# Patient Record
Sex: MALE | Race: WHITE
[De-identification: names, ages, dates, MRNs, and addresses within clinical notes are randomized per-mention and may not be internally consistent; named-entity substitution may affect disease eponyms.]

---

## 2019-12-05 ENCOUNTER — HOSPITAL ENCOUNTER (OUTPATIENT)
Dept: HOSPITAL 47 - SLEEP | Age: 44
End: 2019-12-05
Attending: INTERNAL MEDICINE
Payer: COMMERCIAL

## 2019-12-05 DIAGNOSIS — J98.8: ICD-10-CM

## 2019-12-05 DIAGNOSIS — F10.20: ICD-10-CM

## 2019-12-05 DIAGNOSIS — G47.33: Primary | ICD-10-CM

## 2019-12-05 DIAGNOSIS — Z98.890: ICD-10-CM

## 2019-12-05 DIAGNOSIS — F17.210: ICD-10-CM

## 2019-12-05 PROCEDURE — 99211 OFF/OP EST MAY X REQ PHY/QHP: CPT

## 2019-12-05 NOTE — CONS
CONSULTATION



DATE OF SERVICE:

12/05/2019



This patient is a 44-year-old gentleman who has been evaluated in the sleep center for

difficulties initiating sleep and multiple awakenings from sleep with snoring and

gasping for air.



HISTORY OF PRESENT ILLNESS/SLEEP-WAKE EVALUATION:

Patient's sleep schedule on weekdays is from 11 p.m. until 5 a.m. and he gets out of

bed around 8 a.m. On weekends he sleeps from 1 a.m. until 6 a.m. and he gets out of bed

around 11 a.m. He does have a problem with falling asleep.  He has a TV set in the

bedroom.  He wakes up from sleep multiple times; he has counted more than 30, with up

to 2 episodes of nocturia at night.  The patient starts to see dreams as he is falling

asleep, possibly hypnagogical hallucinations.  No history of sleep paralysis or

cataplexy.  The patient feels significantly sleepy and tired during the day.  If he is

falling asleep during the day, he may see vivid dreams. [place] Sleepiness Scale is in

a very high range at 15.



PAST MEDICAL HISTORY:

Practically negative.



PAST SURGICAL HISTORY:

Right hand surgery.



MEDICATIONS:

None.



SOCIAL HISTORY:

Positive for smoking for 20 pack/years.  Patient continues to smoke.  Alcohol

consumption up to 2 whiskeys every night because the patient believes that it may help

him to sleep better.



REVIEW OF SYSTEMS:

Difficulties initiating sleep. Multiple awakenings from sleep. Sleepiness during the

day.



PHYSICAL EXAMINATION:

GENERAL: A pleasant  gentleman without distress.

VITAL SIGNS: /78, , RR 16. Height _____ inches, weight 183.8 pounds. Body

mass index 24.8. Temperature 98.1. Oxygen saturation at room air 97%.

HEENT: PERRLA, EOMI. Evaluation of oropharynx showed tongue protrudes midline.

Moderately low position of soft palate. Mallampati II to III. Short distance between

soft palate and posterior pharyngeal wall. Significant restriction of nasal breathing.

NECK: Supple. No JVD.  Thyroid is not palpable.

LUNGS: Clear to percussion and to auscultation.  Good air exchange.  No wheezing or

rhonchi.

HEART: S1, S2 regular.  No murmurs, gallops or rubs.

ABDOMEN: Soft and nontender.  Bowel sounds are present.  No organomegaly.

EXTREMITIES:  No clubbing or cyanosis.

CNS: Awake, alert, and oriented X3.  Cranial nerves 2 to 7 intact.  There is no

fasciculation or atrophy. noted.  No focal deficits observed.



IMPRESSION:

1. Snoring, witnessed episodes of stopped breathing during sleep, multiple awakenings

    from sleep, significant excessive daytime sleepiness; obstructive sleep apnea.

2. Significant excessive daytime sleepiness, [place] Sleepiness Scale is 15,

    hypnagogical hallucinations, difficulties sleeping during the night, possibly

    seeing vivid dreams if he is falling asleep during the day; differential diagnosis

    should include narcolepsy without cataplexy.

3. Insomnia could be secondary to narcolepsy or psychophysiological.

4. Restriction of nasal breathing; possible nasal septum deviation.

5. Status post right hand surgery.

6. Twenty pack/year smoker.

7. Alcohol consumption every night. The patient believes alcohol helps him sleep

    better.



PLAN:

1. Polysomnography for evaluation of patient's breathing during sleep.

2. CPAP/BiPAP titration if sleep study confirms obstructive sleep apnea-hypopnea

    syndrome.

3. Preferable position during sleep on the side.

4. No driving if patient feels any sleepiness.

5. I will see patient for follow up visit to explain results of testing and following

    plan.

6. Multiple sleep latency test if the sleep study is negative for obstructive sleep

    apnea-hypopnea syndrome.

7. Patient should try to decrease the amount of alcohol at night because multiple

    research studies showed that alcohol may help some patients to fall asleep, but in

    the second part of the night alcohol usually makes sleep worse.

8. Psychological techniques for treatment of insomnia should include stimulus control,

    paradoxical intention, worry time, no watching the clock.



Thank you very much for referring this patient for consultation.



Sincerely,







Enio Hernandez MD, PhD, FAASM

Diplomat of American [employer]ties

American Board of Internal Medicine

Medical Director of [facility]





MMODL / IJN: [id number] / Job#: [id number]

## 2020-07-01 ENCOUNTER — HOSPITAL ENCOUNTER (OUTPATIENT)
Dept: HOSPITAL 47 - SLEEP | Age: 45
Discharge: HOME | End: 2020-07-01
Attending: INTERNAL MEDICINE
Payer: COMMERCIAL

## 2020-07-01 DIAGNOSIS — R09.81: ICD-10-CM

## 2020-07-01 DIAGNOSIS — Z98.890: ICD-10-CM

## 2020-07-01 DIAGNOSIS — F17.210: ICD-10-CM

## 2020-07-01 DIAGNOSIS — G47.33: Primary | ICD-10-CM

## 2020-07-01 NOTE — SFUN
SLEEP CENTER FOLLOW UP NOTE



DATE OF SERVICE:

07/01/2020



This patient is a 44-year-old gentleman who has been followed in Sleep Center for

treatment of obstructive sleep apnea-hypopnea syndrome. Recently the patient had a

polysomnogram which showed severe sleep apnea with apnea-hypopnea index 69.  Then the

patient had CPAP titration, and during titration his respiration normalized.

Subsequently he was ordered to get CPAP equipment, and today is his first visit after

he started to use CPAP equipment.



Unfortunately the patient has difficulties with CPAP, feels the pressure is too high,

and he was not able to use the machine -- also because he has some difficulties

breathing through his nose.



[place] Sleepiness Scale today is increased to 14.



I checked his CPAP unit. Pressure is 8 cm of water. For about 4 months the has used it

only for 7 nights. When he did use it, apnea-hypopnea index was in normal range at 2.8.



MEDICATIONS:

None.



PHYSICAL EXAMINATION:

GENERAL: A pleasant patient in no distress.

VITAL SIGNS: /85, HR 80, RR 16, weight 183, temperature 98.1, oxygen saturation

at room air 99%.

HEENT: PERRLA, EOMI. Evaluation of oropharynx showed tongue protrudes midline. Low

position of soft palate.

NECK: Supple. No JVD.  Thyroid is not palpable.

LUNGS: Clear to percussion and to auscultation.  Good air exchange.  No wheezing or

rhonchi.

HEART: S1, S2 regular.  No murmurs, gallops or rubs.

ABDOMEN: Soft and nontender.  Bowel sounds are present.  No organomegaly.

EXTREMITIES:  No clubbing or cyanosis.

CNS: Awake, alert, and oriented X3.  Cranial nerves 2 to 7 intact.  There is no

fasciculation or atrophy. noted.  No focal deficits observed.



IMPRESSION:

1. Severe obstructive sleep apnea-hypopnea syndrome.  The patient has difficulties

    with the usage of CPAP.

2. Some restriction of nasal breathing; possibly nasal septum deviation.

3. Status post right hand surgery.

4. Patient is a cigarette smoker.



PLAN:

1. Patient should continue to use CPAP equipment every night for the whole night.  I

    discussed with him risks or complications of not treating obstructive sleep apnea-

    hypopnea syndrome, especially when it is in severe range.

2. I decreased the pressure in his CPAP unit to 5 cm of water.

3. The patient will continue use nasal pillow mask with nasal strips.

4. Watching weight.

5. No driving if feeling sleepiness.

6. Follow-up visit in 1 to 2 months.

Thank you very much for allowing me to participate in the management of your patient.



Sincerely,







Enio Hernandez MD, PhD, FAASM

Diplomat of American [employer] of Medical Specialties

American Board of Internal Medicine

Medical Director of [facility] [place]





MMODL / AFSHANN: [id number] / Job#: [id number]

## 2020-08-21 ENCOUNTER — HOSPITAL ENCOUNTER (OUTPATIENT)
Dept: HOSPITAL 47 - RADCTMAIN | Age: 45
Discharge: HOME | End: 2020-08-21
Attending: NURSE PRACTITIONER
Payer: COMMERCIAL

## 2020-08-21 DIAGNOSIS — G43.809: Primary | ICD-10-CM

## 2020-08-21 PROCEDURE — 70470 CT HEAD/BRAIN W/O & W/DYE: CPT

## 2020-08-21 NOTE — CT
EXAMINATION TYPE: CT brain wo/w con

 

DATE OF EXAM: 8/21/2020

 

COMPARISON: None

 

HISTORY: new onset migraines and hair loss

 

CT DLP: 2247mGycm

 

CONTRAST: 

CT scan of the head is performed without and with IV Contrast, patient injected with 100 mL of Isovue
 300.

 

Unenhanced followed by contrast enhanced CT of the brain is submitted for evaluation.  The ventricles
 are midline.  There is no evidence for intracranial hemorrhage or extra-axial collection.  No mass e
ffects are identified.  Visualized bony calvarium is intact.  Contrast is administered and no enhanci
ng lesions are detected.  No pathologic enhancement is identified.  If symptoms persist consider MRI.
 

 

IMPRESSION: Normal CT brain.

## 2020-10-28 ENCOUNTER — HOSPITAL ENCOUNTER (OUTPATIENT)
Dept: HOSPITAL 47 - SLEEP | Age: 45
Discharge: HOME | End: 2020-10-28
Attending: INTERNAL MEDICINE
Payer: COMMERCIAL

## 2020-10-28 DIAGNOSIS — Z98.890: ICD-10-CM

## 2020-10-28 DIAGNOSIS — Z86.69: ICD-10-CM

## 2020-10-28 DIAGNOSIS — G47.33: Primary | ICD-10-CM

## 2020-10-28 DIAGNOSIS — F17.210: ICD-10-CM

## 2020-10-28 DIAGNOSIS — Z99.89: ICD-10-CM

## 2020-10-28 NOTE — SFUN
SLEEP CENTER FOLLOW UP NOTE



DATE OF SERVICE:

10/28/2020



A 44-year-old gentleman who has been followed in the Sleep Center for treatment of

obstructive sleep apnea-hypopnea syndrome.  During previous visit about 3 months ago,

the patient was not able to use CPAP equipment at all. During that visit, I decreased

the pressure in CPAP unit down.  Presently, patient come back for followup visit.  He

is able to use his CPAP equipment practically every night.  Sleeps better with that.

Feels better during the day.



I checked his CPAP unit.  CPAP pressure is 6.6.  Usage is 30/30 nights for more than 4

hours.  Average is 7.2 hours per night. Leak is 6 L/minute which is normal.  Apnea-

hypopnea index in the range between 7 and 8 and depending what time it was checked for.



MEDICATIONS:

None.



PHYSICAL EXAM:

Patient in no distress, /84, HR 80, RR 16, height 6, 1/4 inch, weight 189.8, temp

is 98.2 oxygen saturation at room air 96%.  BMI 25.4.

OROPHARYNX:  Low position of soft palate.

NECK:  Supple, no JVD.  Thyroid is not palpable.

LUNGS:  Clear to percussion and to auscultation.  Good air exchange.  No wheezing or

rhonchi.

HEART:  S1, S2 regular.  No murmurs, gallops, or rubs.

ABDOMEN:  Soft and nontender.  Bowel sounds are present.  No organomegaly appreciated.

EXTREMITIES: No clubbing or cyanosis.

CNS:  Awake, alert, and oriented X3.  Cranial nerves 2 to 7 intact.  There is no

fasciculation or atrophy. noted.  No focal deficits observed.



IMPRESSION:

1. Severe obstructive sleep apnea-hypopnea syndrome. Apnea-hypopnea index 69.3.  After

    decreasing pressure, patient is able to use equipment 100% of the time, benefitting

    from treatment.

2. Some restriction of nasal breathing, possible nasal septum deviation.

3. Status post right hand surgery.

4. Cigarette smoker.

5. History of migraines.



PLAN:

1. Patient will continue to use PAP equipment every night for the whole night.

2. Sleep hygiene with regular time in bed for at least 7-1/2 to 8 hours.

3. Precautions related to driving. No driving if feeling sleepiness.

4. I will maintain all necessary prescription for PAP supplies including mask, tube,

    filters.

5. Watching weight.

6. No driving if feeling sleepiness.

7. Followup visit in 6 months or earlier if patient has any problems.

Thank you very much for allowing me to participate in the management of your patient.



Sincerely,





Enio Hernandez MD, PhD, FAASM

Diplomat of American Board of Medical Specialties

American Board of Internal Medicine

Medical Director of [facility] [place]





MMODL / PAUL: [id number] / Job#: [id number]

## 2021-08-20 ENCOUNTER — HOSPITAL ENCOUNTER (EMERGENCY)
Dept: HOSPITAL 47 - EC | Age: 46
Discharge: HOME | End: 2021-08-20
Payer: COMMERCIAL

## 2021-08-20 VITALS
SYSTOLIC BLOOD PRESSURE: 110 MMHG | TEMPERATURE: 97.8 F | DIASTOLIC BLOOD PRESSURE: 68 MMHG | RESPIRATION RATE: 18 BRPM | HEART RATE: 90 BPM

## 2021-08-20 DIAGNOSIS — S22.41XA: Primary | ICD-10-CM

## 2021-08-20 DIAGNOSIS — F17.200: ICD-10-CM
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
## 2021-08-20 LAB
PH UR: 6.5 [PH] (ref 5–8)
SP GR UR: 1.02 (ref 1–1.03)
UROBILINOGEN UR QL STRIP: <2 MG/DL (ref ?–2)

## 2021-08-20 PROCEDURE — 81003 URINALYSIS AUTO W/O SCOPE: CPT

## 2021-08-20 PROCEDURE — 96372 THER/PROPH/DIAG INJ SC/IM: CPT

## 2021-08-20 PROCEDURE — 71101 X-RAY EXAM UNILAT RIBS/CHEST: CPT

## 2021-08-20 PROCEDURE — 99284 EMERGENCY DEPT VISIT MOD MDM: CPT

## 2021-08-20 NOTE — XR
EXAMINATION TYPE: XR ribs RT w pa chest xray

 

DATE OF EXAM: 8/20/2021

 

COMPARISON: NONE

 

TECHNIQUE: Frontal view of the chest and 4 views of the right ribs are submitted submitted.

 

HISTORY: Right posterior rib pain

 

FINDINGS:

The lungs are clear and  there is no pneumothorax, pleural effusion, or focal pneumonia.  

Arthropathy of the shoulders.

There is a displaced fracture involving the posterior margin of the right seventh and eighth rib

 

IMPRESSION: 

1. Displaced fractures involving the posterior margin of the left seventh and eighth ribs.

## 2021-08-20 NOTE — ED
Fall HPI





- General


Chief Complaint: Fall


Stated Complaint: fall, rib & back pain


Time Seen by Provider: 08/20/21 15:02


Source: patient


Mode of arrival: ambulatory





- History of Present Illness


Initial Comments: 


45 year-old male patient presents to the emergency department for right sided 

back pain. States this morning around 7am he fell down two stairs after missing 

a step. States he fell backwards and struck his back on the steps. He is 

reporting right upper back pain over the ribs. States he can feel a 

clicking/popping sensation with movement. Pain worsens with movement and 

breathing.  He denies any cough or hemoptysis.  Denies any chest pain.  Denies 

hitting his head or lose consciousness with the fall.  Denies any neck pain.  He

is reporting some tingling sensation to the right arm.  Has not taken anything 

for pain.  Patient denies any headache, shortness of breath, dizziness, 

weakness, abdominal pain, nausea, vomiting, or difficulties with bowel movements

or urination.





- Related Data


                                Home Medications











 Medication  Instructions  Recorded  Confirmed


 


Fremanezumab-Vfrm [Ajovy Syringe] 225 mg INJ Q28D 08/20/21 08/20/21


 


Rimegepant Sulfate [Nurtec Odt] 75 mg PO DAILY PRN 08/20/21 08/20/21








                                  Previous Rx's











 Medication  Instructions  Recorded


 


HYDROcodone/APAP 5-325MG [Norco 5] 1 each PO Q6HR PRN #12 tab 08/20/21


 


Ibuprofen [Motrin] 600 mg PO Q8HR PRN #30 tab 08/20/21


 


Lidocaine 5% Patch [Lidoderm] 1 patch TOPICAL DAILY #30 patch 08/20/21











                                    Allergies











Allergy/AdvReac Type Severity Reaction Status Date / Time


 


No Known Allergies Allergy   Verified 08/20/21 16:21














Review of Systems


ROS Statement: 


Those systems with pertinent positive or pertinent negative responses have been 

documented in the HPI.





ROS Other: All systems not noted in ROS Statement are negative.





Past Medical History


Past Medical History: No Reported History


History of Any Multi-Drug Resistant Organisms: None Reported


Past Surgical History: Orthopedic Surgery


Additional Past Surgical History / Comment(s): rt hand


Past Psychological History: No Psychological Hx Reported


Smoking Status: Current every day smoker


Past Alcohol Use History: Occasional


Past Drug Use History: None Reported





General Exam


Limitations: no limitations


General appearance: alert, in no apparent distress, other (Physical well-

developed, well-nourished adult male patient in mild distress related to pain.  

Vital signs upon presentation are temperature 98.7F, pulse 104, respirations 

20, blood pressure 129/88, pulse ox 96% on room air.)


ENT exam: Present: normal exam, normal oropharynx, mucous membranes moist


Respiratory exam: Present: normal lung sounds bilaterally.  Absent: respiratory 

distress, wheezes, rales, rhonchi, stridor


Cardiovascular Exam: Present: normal rhythm, tachycardia, normal heart sounds.  

Absent: systolic murmur, diastolic murmur, rubs, gallop, clicks


GI/Abdominal exam: Present: soft, normal bowel sounds.  Absent: distended, 

tenderness, guarding, rebound, rigid


Extremities exam: Present: normal inspection, full ROM, normal capillary refill,

 other (Radial pulses 2+).  Absent: tenderness, pedal edema, joint swelling, 

calf tenderness


Back exam: Present: tenderness (Right posterior ribs), other (abrasion noted 

over the right posterior ribs).  Absent: vertebral tenderness


Neurological exam: Present: alert, oriented X3, CN II-XII intact


Psychiatric exam: Present: normal affect, normal mood


Skin exam: Present: warm, dry, intact, normal color.  Absent: rash





Course


                                   Vital Signs











  08/20/21 08/20/21





  14:37 17:05


 


Temperature 98.7 F 97.8 F


 


Pulse Rate 104 H 90


 


Respiratory 20 18





Rate  


 


Blood Pressure 129/88 110/68


 


O2 Sat by Pulse 96 97





Oximetry  














Medical Decision Making





- Medical Decision Making


45-year-old male patient presents to the emergency department today for 

evaluation of right sided back pain.  Physical examination did reveal tenderness

 and abrasion over the right posterior ribs.  No spinal tenderness.  No flank 

tenderness.  X-ray was obtained of the chest and the right ribs and showed evide

nce for displaced right seventh and eighth rib fractures.  I did discuss 

findings and results with him.  He'll be treated with pain medication, Lidoderm 

patch, and given incentive spirometer for prevention of pneumonia.  He is 

instructed to follow-up the primary care physician for recheck in 1-2 days.  

Return parameters were discussed in detail.  He verbalizes understanding and 

agrees with this plan.  Case discussed with my attending Dr. Jimenez.








- Radiology Data


Radiology results: report reviewed, image reviewed


X-ray of the right ribs with chest is obtained.  Report was reviewed in its 

entirety.  Impression by Dr. alexandre shows displaced fractures involving the 

posterior margin of the left seventh and eighth ribs.





Disposition


Clinical Impression: 


 Fracture of rib of right side





Disposition: HOME SELF-CARE


Condition: Good


Instructions (If sedation given, give patient instructions):  How to Use an 

Incentive Spirometer (ED), Rib Fracture (ED)


Additional Instructions: 


Take medications as directed.  Splint when coughing or sneezing.  Rest.  Follow-

up with the primary care physician for recheck in 1-2 days.  Return for any new,

 worsening, or concerning symptoms.


Prescriptions: 


Lidocaine 5% Patch [Lidoderm] 1 patch TOPICAL DAILY #30 patch


Ibuprofen [Motrin] 600 mg PO Q8HR PRN #30 tab


 PRN Reason: Pain


HYDROcodone/APAP 5-325MG [Norco 5] 1 each PO Q6HR PRN #12 tab


 PRN Reason: Pain


Is patient prescribed a controlled substance at d/c from ED?: No


Referrals: 


Marco A Al MD [Primary Care Provider] - 1-2 days


Time of Disposition: 16:47